# Patient Record
Sex: MALE | URBAN - METROPOLITAN AREA
[De-identification: names, ages, dates, MRNs, and addresses within clinical notes are randomized per-mention and may not be internally consistent; named-entity substitution may affect disease eponyms.]

---

## 2019-02-06 ENCOUNTER — APPOINTMENT (RX ONLY)
Dept: URBAN - METROPOLITAN AREA CLINIC 108 | Facility: CLINIC | Age: 54
Setting detail: DERMATOLOGY
End: 2019-02-06

## 2019-02-06 DIAGNOSIS — L65.9 NONSCARRING HAIR LOSS, UNSPECIFIED: ICD-10-CM

## 2019-02-06 DIAGNOSIS — L72.0 EPIDERMAL CYST: ICD-10-CM

## 2019-02-06 DIAGNOSIS — L82.1 OTHER SEBORRHEIC KERATOSIS: ICD-10-CM

## 2019-02-06 DIAGNOSIS — L91.8 OTHER HYPERTROPHIC DISORDERS OF THE SKIN: ICD-10-CM

## 2019-02-06 PROCEDURE — ? COUNSELING

## 2019-02-06 PROCEDURE — ? TREATMENT REGIMEN

## 2019-02-06 PROCEDURE — ? BENIGN DESTRUCTION COSMETIC

## 2019-02-06 PROCEDURE — ? INCISION AND DRAINAGE

## 2019-02-06 PROCEDURE — 10060 I&D ABSCESS SIMPLE/SINGLE: CPT

## 2019-02-06 PROCEDURE — 99203 OFFICE O/P NEW LOW 30 MIN: CPT | Mod: 25

## 2019-02-06 ASSESSMENT — LOCATION SIMPLE DESCRIPTION DERM
LOCATION SIMPLE: RIGHT UPPER BACK
LOCATION SIMPLE: UPPER BACK
LOCATION SIMPLE: ANTERIOR SCALP
LOCATION SIMPLE: RIGHT AXILLARY VAULT

## 2019-02-06 ASSESSMENT — LOCATION DETAILED DESCRIPTION DERM
LOCATION DETAILED: MID-FRONTAL SCALP
LOCATION DETAILED: SUPERIOR THORACIC SPINE
LOCATION DETAILED: RIGHT INFERIOR MEDIAL UPPER BACK
LOCATION DETAILED: RIGHT AXILLARY VAULT

## 2019-02-06 ASSESSMENT — LOCATION ZONE DERM
LOCATION ZONE: SCALP
LOCATION ZONE: TRUNK
LOCATION ZONE: AXILLAE

## 2019-02-06 NOTE — PROCEDURE: INCISION AND DRAINAGE
Include Sutures?: No
Suture Text: The incision was partially closed with
Epidermal Closure: simple interrupted
Method: 11 blade
Consent was obtained and risks were reviewed including but not limited to delayed wound healing, infection, need for multiple I and D's, and pain.
Epidermal Sutures: 4-0 Ethilon
Lesion Type: Abscess
Preparation Text: The area was prepped in the usual clean fashion.
Dressing: dry sterile dressing
Post-Care Instructions: I reviewed with the patient in detail post-care instructions. Patient should keep wound covered and call the office should any redness, pain, swelling or worsening occur.
Anesthesia Type: 1% lidocaine with epinephrine
Detail Level: Detailed
Size Of Lesion In Cm (Optional But May Be Required For Some Insurances): 0
Wound Care: Petrolatum
Curette Text (Optional): After the contents were expressed a curette was used to partially remove the cyst wall.

## 2019-05-02 ENCOUNTER — APPOINTMENT (RX ONLY)
Dept: URBAN - METROPOLITAN AREA CLINIC 108 | Facility: CLINIC | Age: 54
Setting detail: DERMATOLOGY
End: 2019-05-02

## 2019-05-02 DIAGNOSIS — L82.1 OTHER SEBORRHEIC KERATOSIS: ICD-10-CM

## 2019-05-02 DIAGNOSIS — L259 CONTACT DERMATITIS AND OTHER ECZEMA, UNSPECIFIED CAUSE: ICD-10-CM

## 2019-05-02 DIAGNOSIS — L65.9 NONSCARRING HAIR LOSS, UNSPECIFIED: ICD-10-CM

## 2019-05-02 PROBLEM — L23.9 ALLERGIC CONTACT DERMATITIS, UNSPECIFIED CAUSE: Status: ACTIVE | Noted: 2019-05-02

## 2019-05-02 PROCEDURE — ? INVENTORY

## 2019-05-02 PROCEDURE — 99213 OFFICE O/P EST LOW 20 MIN: CPT

## 2019-05-02 PROCEDURE — ? TREATMENT REGIMEN

## 2019-05-02 PROCEDURE — ? COUNSELING

## 2019-05-02 ASSESSMENT — LOCATION SIMPLE DESCRIPTION DERM
LOCATION SIMPLE: RIGHT EAR
LOCATION SIMPLE: LEFT EAR
LOCATION SIMPLE: ANTERIOR SCALP
LOCATION SIMPLE: RIGHT CHEEK

## 2019-05-02 ASSESSMENT — LOCATION DETAILED DESCRIPTION DERM
LOCATION DETAILED: RIGHT CENTRAL MALAR CHEEK
LOCATION DETAILED: MID-FRONTAL SCALP
LOCATION DETAILED: RIGHT SUPERIOR CRUS OF ANTIHELIX
LOCATION DETAILED: LEFT CYMBA CONCHA

## 2019-05-02 ASSESSMENT — LOCATION ZONE DERM
LOCATION ZONE: SCALP
LOCATION ZONE: EAR
LOCATION ZONE: FACE

## 2019-05-02 NOTE — PROCEDURE: COUNSELING
Detail Level: Simple
Detail Level: Zone
Patient Specific Counseling (Will Not Stick From Patient To Patient): Keep eye on location for any growth or bleeding occurs. (Biopsy next visit)

## 2019-05-02 NOTE — PROCEDURE: TREATMENT REGIMEN
Plan: stop the medicated shampoo \\nresume nutrafol vitamins.\\nUse low chemical hair products.
Detail Level: Zone
Plan: Document when allergic reaction happens.\\nApply pramasone if rash in ears reoccur.

## 2019-08-01 ENCOUNTER — APPOINTMENT (RX ONLY)
Dept: URBAN - METROPOLITAN AREA CLINIC 108 | Facility: CLINIC | Age: 54
Setting detail: DERMATOLOGY
End: 2019-08-01

## 2019-08-01 DIAGNOSIS — L29.89 OTHER PRURITUS: ICD-10-CM

## 2019-08-01 DIAGNOSIS — B37.2 CANDIDIASIS OF SKIN AND NAIL: ICD-10-CM

## 2019-08-01 DIAGNOSIS — L64.8 OTHER ANDROGENIC ALOPECIA: ICD-10-CM

## 2019-08-01 DIAGNOSIS — L29.8 OTHER PRURITUS: ICD-10-CM

## 2019-08-01 PROCEDURE — ? TREATMENT REGIMEN

## 2019-08-01 PROCEDURE — 99214 OFFICE O/P EST MOD 30 MIN: CPT

## 2019-08-01 PROCEDURE — ? INVENTORY

## 2019-08-01 PROCEDURE — ? COUNSELING

## 2019-08-01 ASSESSMENT — LOCATION SIMPLE DESCRIPTION DERM
LOCATION SIMPLE: SCALP
LOCATION SIMPLE: GENITALIA
LOCATION SIMPLE: GROIN

## 2019-08-01 ASSESSMENT — LOCATION DETAILED DESCRIPTION DERM
LOCATION DETAILED: GENITALIA
LOCATION DETAILED: RIGHT SUPERIOR PARIETAL SCALP
LOCATION DETAILED: SUPRAPUBIC SKIN

## 2019-08-01 ASSESSMENT — LOCATION ZONE DERM
LOCATION ZONE: SCALP
LOCATION ZONE: GENITALIA
LOCATION ZONE: TRUNK

## 2020-05-15 ENCOUNTER — APPOINTMENT (RX ONLY)
Dept: URBAN - METROPOLITAN AREA CLINIC 108 | Facility: CLINIC | Age: 55
Setting detail: DERMATOLOGY
End: 2020-05-15

## 2020-05-15 DIAGNOSIS — B35.6 TINEA CRURIS: ICD-10-CM

## 2020-05-15 PROCEDURE — ? ADDITIONAL NOTES

## 2020-05-15 PROCEDURE — 99213 OFFICE O/P EST LOW 20 MIN: CPT | Mod: 95

## 2020-05-15 PROCEDURE — ? COUNSELING

## 2020-05-15 PROCEDURE — ? PRESCRIPTION

## 2020-05-15 RX ORDER — KETOCONAZOLE 20 MG/G
CREAM TOPICAL BID
Qty: 1 | Refills: 1 | Status: ERX | COMMUNITY
Start: 2020-05-15

## 2020-05-15 RX ORDER — HYDROCORTISONE 25 MG/G
CREAM TOPICAL
Qty: 1 | Refills: 1 | Status: ERX | COMMUNITY
Start: 2020-05-15

## 2020-05-15 RX ADMIN — KETOCONAZOLE: 20 CREAM TOPICAL at 00:00

## 2020-05-15 RX ADMIN — HYDROCORTISONE: 25 CREAM TOPICAL at 00:00

## 2020-05-15 ASSESSMENT — LOCATION ZONE DERM: LOCATION ZONE: TRUNK

## 2020-05-15 ASSESSMENT — LOCATION SIMPLE DESCRIPTION DERM: LOCATION SIMPLE: GROIN

## 2020-05-15 ASSESSMENT — LOCATION DETAILED DESCRIPTION DERM: LOCATION DETAILED: RIGHT INGUINAL CREASE

## 2020-12-09 ENCOUNTER — APPOINTMENT (RX ONLY)
Dept: URBAN - METROPOLITAN AREA CLINIC 108 | Facility: CLINIC | Age: 55
Setting detail: DERMATOLOGY
End: 2020-12-09

## 2020-12-09 ENCOUNTER — RX ONLY (OUTPATIENT)
Age: 55
Setting detail: RX ONLY
End: 2020-12-09

## 2020-12-09 VITALS — TEMPERATURE: 97.6 F

## 2020-12-09 DIAGNOSIS — L64.8 OTHER ANDROGENIC ALOPECIA: ICD-10-CM

## 2020-12-09 DIAGNOSIS — B35.6 TINEA CRURIS: ICD-10-CM

## 2020-12-09 DIAGNOSIS — L259 CONTACT DERMATITIS AND OTHER ECZEMA, UNSPECIFIED CAUSE: ICD-10-CM

## 2020-12-09 PROBLEM — L23.9 ALLERGIC CONTACT DERMATITIS, UNSPECIFIED CAUSE: Status: ACTIVE | Noted: 2020-12-09

## 2020-12-09 PROCEDURE — ? ADDITIONAL NOTES

## 2020-12-09 PROCEDURE — 99213 OFFICE O/P EST LOW 20 MIN: CPT

## 2020-12-09 PROCEDURE — ? FULL BODY SKIN EXAM - DECLINED

## 2020-12-09 PROCEDURE — ? COUNSELING

## 2020-12-09 PROCEDURE — ? TREATMENT REGIMEN

## 2020-12-09 RX ORDER — HYDROCORTISONE 25 MG/G
CREAM TOPICAL
Qty: 1 | Refills: 1 | Status: ERX

## 2020-12-09 RX ORDER — KETOCONAZOLE 20 MG/G
CREAM TOPICAL BID
Qty: 1 | Refills: 1 | Status: ERX

## 2020-12-09 ASSESSMENT — LOCATION DETAILED DESCRIPTION DERM
LOCATION DETAILED: LEFT ANTERIOR PROXIMAL THIGH
LOCATION DETAILED: RIGHT INGUINAL CREASE
LOCATION DETAILED: RIGHT SUPERIOR PARIETAL SCALP

## 2020-12-09 ASSESSMENT — LOCATION SIMPLE DESCRIPTION DERM
LOCATION SIMPLE: LEFT THIGH
LOCATION SIMPLE: SCALP
LOCATION SIMPLE: GROIN

## 2020-12-09 ASSESSMENT — LOCATION ZONE DERM
LOCATION ZONE: TRUNK
LOCATION ZONE: SCALP
LOCATION ZONE: LEG

## 2020-12-09 NOTE — PROCEDURE: ADDITIONAL NOTES
Additional Notes: After discussion of the risks, benefits and limitations with respect to this Telehealth visit, including potential limitations in picture and video quality, the patient has given verbal consent to proceed with this Telehealth visit. Interactive audio and video telecommunications were used to permit real-time communication between myself and the patient.  This Telehealth visit was performed due to the national COVID-19 emergency and recommended social distancing.
Detail Level: Simple
Additional Notes: Discussed pros and cons about light cap. Advised continuing vitamins....again discussed propecia and minoxidil pt states that he still would only like to take the vitamins

## 2020-12-09 NOTE — PROCEDURE: TREATMENT REGIMEN
Otc Regimen: Zeasorb AF or gold bond AF powders
Plan: SSCR, pt never took fluconazole pills when first prescribed, consider taking it now.
Detail Level: Zone
Continue Regimen: Hydrocortisone and ketoconazole cream as directed

## 2022-08-24 ENCOUNTER — APPOINTMENT (RX ONLY)
Dept: URBAN - METROPOLITAN AREA CLINIC 108 | Facility: CLINIC | Age: 57
Setting detail: DERMATOLOGY
End: 2022-08-24

## 2022-08-24 DIAGNOSIS — L81.4 OTHER MELANIN HYPERPIGMENTATION: ICD-10-CM

## 2022-08-24 DIAGNOSIS — L72.0 EPIDERMAL CYST: ICD-10-CM

## 2022-08-24 DIAGNOSIS — L64.8 OTHER ANDROGENIC ALOPECIA: ICD-10-CM | Status: INADEQUATELY CONTROLLED

## 2022-08-24 DIAGNOSIS — L82.1 OTHER SEBORRHEIC KERATOSIS: ICD-10-CM

## 2022-08-24 PROCEDURE — ? FULL BODY SKIN EXAM - DECLINED

## 2022-08-24 PROCEDURE — ? PRESCRIPTION MEDICATION MANAGEMENT

## 2022-08-24 PROCEDURE — ? SUNSCREEN RECOMMENDATIONS

## 2022-08-24 PROCEDURE — ? COUNSELING

## 2022-08-24 PROCEDURE — 99214 OFFICE O/P EST MOD 30 MIN: CPT

## 2022-08-24 PROCEDURE — ? ADDITIONAL NOTES

## 2022-08-24 ASSESSMENT — LOCATION DETAILED DESCRIPTION DERM
LOCATION DETAILED: RIGHT INFERIOR UPPER BACK
LOCATION DETAILED: LEFT SUPERIOR UPPER BACK
LOCATION DETAILED: SUPERIOR THORACIC SPINE
LOCATION DETAILED: RIGHT MID-UPPER BACK
LOCATION DETAILED: LEFT MID-UPPER BACK
LOCATION DETAILED: LEFT SUPERIOR PARIETAL SCALP

## 2022-08-24 ASSESSMENT — LOCATION SIMPLE DESCRIPTION DERM
LOCATION SIMPLE: UPPER BACK
LOCATION SIMPLE: RIGHT UPPER BACK
LOCATION SIMPLE: LEFT UPPER BACK
LOCATION SIMPLE: SCALP

## 2022-08-24 ASSESSMENT — LOCATION ZONE DERM
LOCATION ZONE: SCALP
LOCATION ZONE: TRUNK

## 2022-08-24 NOTE — PROCEDURE: PRESCRIPTION MEDICATION MANAGEMENT
Plan: Discussed treatment with topical medications such as OTC men’s 5% Rogaine foam \\nDiscussed continuation of oral vitamins that patient is currently taking purchased in-office.\\npt still does not want to take any other prescription medications \\nDiscussed PRP procedure and Microneedling. Patient states he will do research on PRP and make a decision.\\n
Render In Strict Bullet Format?: No
Detail Level: Zone

## 2022-08-24 NOTE — PROCEDURE: ADDITIONAL NOTES
Render Risk Assessment In Note?: yes
Detail Level: Simple
Additional Notes: Discussed treatment with excision, pt not interested at this time
Additional Notes: After discussion of the risks, benefits and limitations with respect to this Telehealth visit, including potential limitations in picture and video quality, the patient has given verbal consent to proceed with this Telehealth visit. Interactive audio and video telecommunications were used to permit real-time communication between myself and the patient.  This Telehealth visit was performed due to the national COVID-19 emergency and recommended social distancing.

## 2025-01-27 ENCOUNTER — APPOINTMENT (OUTPATIENT)
Dept: URBAN - METROPOLITAN AREA CLINIC 108 | Facility: CLINIC | Age: 60
Setting detail: DERMATOLOGY
End: 2025-01-27

## 2025-01-27 DIAGNOSIS — L72.8 OTHER FOLLICULAR CYSTS OF THE SKIN AND SUBCUTANEOUS TISSUE: ICD-10-CM

## 2025-01-27 DIAGNOSIS — L65.9 NONSCARRING HAIR LOSS, UNSPECIFIED: ICD-10-CM | Status: INADEQUATELY CONTROLLED

## 2025-01-27 DIAGNOSIS — L82.0 INFLAMED SEBORRHEIC KERATOSIS: ICD-10-CM

## 2025-01-27 PROCEDURE — ? DEFER

## 2025-01-27 PROCEDURE — ? FULL BODY SKIN EXAM - DECLINED

## 2025-01-27 PROCEDURE — ? COUNSELING

## 2025-01-27 PROCEDURE — ? CONSULTATION EXCISION

## 2025-01-27 PROCEDURE — ? PRESCRIPTION MEDICATION MANAGEMENT

## 2025-01-27 PROCEDURE — 99213 OFFICE O/P EST LOW 20 MIN: CPT

## 2025-01-27 ASSESSMENT — LOCATION SIMPLE DESCRIPTION DERM
LOCATION SIMPLE: RIGHT UPPER BACK
LOCATION SIMPLE: LEFT SCALP
LOCATION SIMPLE: UPPER BACK

## 2025-01-27 ASSESSMENT — LOCATION DETAILED DESCRIPTION DERM
LOCATION DETAILED: RIGHT MEDIAL UPPER BACK
LOCATION DETAILED: INFERIOR THORACIC SPINE
LOCATION DETAILED: LEFT MEDIAL FRONTAL SCALP

## 2025-01-27 ASSESSMENT — LOCATION ZONE DERM
LOCATION ZONE: TRUNK
LOCATION ZONE: SCALP

## 2025-01-27 NOTE — PROCEDURE: PRESCRIPTION MEDICATION MANAGEMENT
Plan: Treatments discussed: compound solution (Finasteride 0.3% and minoxidil 6% solution), minoxidil tablets, finasteride tablet, or OTC minoxidil solution.\\n\\nPt declines medication for now
Detail Level: Zone
Render In Strict Bullet Format?: No

## 2025-01-27 NOTE — PROCEDURE: DEFER
Size Of Lesion In Cm (Optional): 0
Detail Level: Detailed
Introduction Text (Please End With A Colon): :
Procedure To Be Performed At Next Visit: Liquid nitrogen

## 2025-04-02 ENCOUNTER — APPOINTMENT (OUTPATIENT)
Dept: URBAN - METROPOLITAN AREA CLINIC 108 | Facility: CLINIC | Age: 60
Setting detail: DERMATOLOGY
End: 2025-04-02

## 2025-04-02 DIAGNOSIS — L82.0 INFLAMED SEBORRHEIC KERATOSIS: ICD-10-CM

## 2025-04-02 DIAGNOSIS — L72.8 OTHER FOLLICULAR CYSTS OF THE SKIN AND SUBCUTANEOUS TISSUE: ICD-10-CM

## 2025-04-02 DIAGNOSIS — L65.9 NONSCARRING HAIR LOSS, UNSPECIFIED: ICD-10-CM

## 2025-04-02 PROCEDURE — 17111 DESTRUCTION B9 LESIONS 15/>: CPT

## 2025-04-02 PROCEDURE — ? COUNSELING

## 2025-04-02 PROCEDURE — 99213 OFFICE O/P EST LOW 20 MIN: CPT | Mod: 25

## 2025-04-02 PROCEDURE — ? FULL BODY SKIN EXAM - DECLINED

## 2025-04-02 PROCEDURE — ? PRESCRIPTION MEDICATION MANAGEMENT

## 2025-04-02 PROCEDURE — ? LIQUID NITROGEN

## 2025-04-02 ASSESSMENT — LOCATION DETAILED DESCRIPTION DERM
LOCATION DETAILED: LEFT INFERIOR LATERAL UPPER BACK
LOCATION DETAILED: LEFT INFERIOR UPPER BACK
LOCATION DETAILED: RIGHT INFERIOR UPPER BACK
LOCATION DETAILED: LEFT SUPERIOR LATERAL UPPER BACK
LOCATION DETAILED: SUPERIOR THORACIC SPINE
LOCATION DETAILED: LEFT SUPERIOR UPPER BACK
LOCATION DETAILED: LEFT INFERIOR LATERAL MIDBACK
LOCATION DETAILED: RIGHT SUPERIOR LATERAL LOWER BACK
LOCATION DETAILED: RIGHT INFERIOR LATERAL MIDBACK
LOCATION DETAILED: RIGHT SUPERIOR MEDIAL UPPER BACK
LOCATION DETAILED: LEFT MEDIAL FRONTAL SCALP
LOCATION DETAILED: LEFT POSTERIOR SHOULDER
LOCATION DETAILED: RIGHT SUPERIOR LATERAL UPPER BACK
LOCATION DETAILED: LEFT SUPERIOR MEDIAL MIDBACK
LOCATION DETAILED: INFERIOR THORACIC SPINE

## 2025-04-02 ASSESSMENT — LOCATION SIMPLE DESCRIPTION DERM
LOCATION SIMPLE: LEFT LOWER BACK
LOCATION SIMPLE: LEFT SCALP
LOCATION SIMPLE: UPPER BACK
LOCATION SIMPLE: LEFT SHOULDER
LOCATION SIMPLE: RIGHT LOWER BACK
LOCATION SIMPLE: LEFT UPPER BACK
LOCATION SIMPLE: RIGHT UPPER BACK

## 2025-04-02 ASSESSMENT — LOCATION ZONE DERM
LOCATION ZONE: SCALP
LOCATION ZONE: ARM
LOCATION ZONE: TRUNK

## 2025-07-03 ENCOUNTER — APPOINTMENT (OUTPATIENT)
Dept: URBAN - METROPOLITAN AREA CLINIC 108 | Facility: CLINIC | Age: 60
Setting detail: DERMATOLOGY
End: 2025-07-03

## 2025-07-03 DIAGNOSIS — B36.0 PITYRIASIS VERSICOLOR: ICD-10-CM

## 2025-07-03 DIAGNOSIS — L65.9 NONSCARRING HAIR LOSS, UNSPECIFIED: ICD-10-CM

## 2025-07-03 DIAGNOSIS — L82.0 INFLAMED SEBORRHEIC KERATOSIS: ICD-10-CM

## 2025-07-03 PROCEDURE — ? LIQUID NITROGEN

## 2025-07-03 PROCEDURE — ? FULL BODY SKIN EXAM - DECLINED

## 2025-07-03 PROCEDURE — ? PRESCRIPTION

## 2025-07-03 PROCEDURE — ? PRESCRIPTION MEDICATION MANAGEMENT

## 2025-07-03 PROCEDURE — ? COUNSELING

## 2025-07-03 RX ORDER — KETOCONAZOLE 20 MG/ML
SHAMPOO, SUSPENSION TOPICAL BIW
Qty: 120 | Refills: 12 | Status: ERX | COMMUNITY
Start: 2025-07-03

## 2025-07-03 RX ADMIN — KETOCONAZOLE: 20 SHAMPOO, SUSPENSION TOPICAL at 00:00

## 2025-07-03 ASSESSMENT — LOCATION SIMPLE DESCRIPTION DERM
LOCATION SIMPLE: LOWER BACK
LOCATION SIMPLE: LEFT UPPER BACK
LOCATION SIMPLE: LEFT SCALP
LOCATION SIMPLE: RIGHT UPPER BACK
LOCATION SIMPLE: LEFT LOWER BACK

## 2025-07-03 ASSESSMENT — LOCATION DETAILED DESCRIPTION DERM
LOCATION DETAILED: LEFT MEDIAL UPPER BACK
LOCATION DETAILED: RIGHT SUPERIOR LATERAL UPPER BACK
LOCATION DETAILED: LEFT SUPERIOR MEDIAL MIDBACK
LOCATION DETAILED: LEFT MEDIAL FRONTAL SCALP
LOCATION DETAILED: LEFT SUPERIOR LATERAL UPPER BACK
LOCATION DETAILED: LEFT INFERIOR UPPER BACK
LOCATION DETAILED: RIGHT SUPERIOR MEDIAL UPPER BACK
LOCATION DETAILED: RIGHT MID-UPPER BACK
LOCATION DETAILED: LEFT SUPERIOR UPPER BACK
LOCATION DETAILED: RIGHT INFERIOR UPPER BACK
LOCATION DETAILED: SUPERIOR LUMBAR SPINE

## 2025-07-03 ASSESSMENT — LOCATION ZONE DERM
LOCATION ZONE: TRUNK
LOCATION ZONE: SCALP

## 2025-07-03 NOTE — PROCEDURE: PRESCRIPTION MEDICATION MANAGEMENT
Plan: PCP prescribed Finasteride tablet. Pt declines minoxidil 2.5 mg tablet
Detail Level: Zone
Render In Strict Bullet Format?: No
Initiate Treatment: ketoconazole 2 % shampoo: Use 3 times a week until resolved. Apply to affected areas on the body, let sit for 5-10 minutes and then rinse. Once resolved use once weekly as maintenance
Plan: Apply OTC benzoyl peroxide wash once daily to the back. Alternate days with the ketoconazole shampoo.